# Patient Record
Sex: FEMALE | Race: BLACK OR AFRICAN AMERICAN
[De-identification: names, ages, dates, MRNs, and addresses within clinical notes are randomized per-mention and may not be internally consistent; named-entity substitution may affect disease eponyms.]

---

## 2019-03-20 ENCOUNTER — HOSPITAL ENCOUNTER (EMERGENCY)
Dept: HOSPITAL 72 - EMR | Age: 29
Discharge: HOME | End: 2019-03-20
Payer: COMMERCIAL

## 2019-03-20 VITALS — SYSTOLIC BLOOD PRESSURE: 119 MMHG | DIASTOLIC BLOOD PRESSURE: 67 MMHG

## 2019-03-20 VITALS — BODY MASS INDEX: 38.25 KG/M2 | HEIGHT: 66 IN | WEIGHT: 238 LBS

## 2019-03-20 DIAGNOSIS — Z88.0: ICD-10-CM

## 2019-03-20 DIAGNOSIS — L03.031: ICD-10-CM

## 2019-03-20 DIAGNOSIS — B35.1: Primary | ICD-10-CM

## 2019-03-20 PROCEDURE — 99282 EMERGENCY DEPT VISIT SF MDM: CPT

## 2019-03-20 NOTE — NUR
ED Nurse Note:



pt cleared to be d/c per ER provider, pt d/c and aftercare instruction provided 
w/ prescription, pt education done via discussion and handout, pt advised to 
follow up with pcp or return to ed if sx worsen or new sx develop, pt 
verbalized understanding and agrees with plan, vss, resp even and unlabored on 
RA, ambulatory w/ steady gait, left w/ all belongings. wristband removed.

## 2019-03-20 NOTE — EMERGENCY ROOM REPORT
History of Present Illness


General


Chief Complaint:  Lower Extremity Injury


Source:  Patient





Present Illness


HPI


28-year-old female presents to the emergency department complaining of 10 out 

of 10 in severity localized pain to the nail of the right great toe which has 

been progressive over the course of one week.  Patient reports initially she 

had acrylic nails on and began to have a tender/burning sensation under the 

right great toe after working a long shift when steel toe boots.  Patient was 

concerned that maybe she had bleeding under the toenail so she went to nail 

salon and had acrylic removed at that time one of the nail salon workers 

suggested that the patient may have an infection/ingrown toenail so she trimmed 

down the side of the nail.  Patient states that her symptoms progressed and the 

pain became more frequent with some swelling she returned back to the nail 

salon yesterday and the nail check trimmed the toenail down to approximately 50

% since states that her toe is now even more tender she denies ever noticing 

bruising she denies discharge she denies history of gout, fevers or chills.  

Patient denies appreciable trauma or fall.


Allergies:  


Coded Allergies:  


     PENICILLINS (Verified  Allergy, Severe, Rash, 3/20/19)





Patient History


Past Medical History:  see triage record


Past Surgical History:  none


Pertinent Family History:  none


Last Menstrual Period:  march


Pregnant Now:  No


Reviewed Nursing Documentation:  PMH: Agreed; PSxH: Agreed





Nursing Documentation-PMH


Past Medical History:  No Stated History





Review of Systems


All Other Systems:  negative except mentioned in HPI





Physical Exam





Vital Signs








  Date Time  Temp Pulse Resp B/P (MAP) Pulse Ox O2 Delivery O2 Flow Rate FiO2


 


3/20/19 19:09 98.6 80 18 120/76 98 Room Air  








Sp02 EP Interpretation:  reviewed, normal


General Appearance:  alert, GCS 15, non-toxic, mild distress


Head:  normocephalic, atraumatic


Eyes:  bilateral eye normal inspection, bilateral eye PERRL


ENT:  hearing grossly normal, normal voice


Neck:  full range of motion


Respiratory:  lungs clear, normal breath sounds, speaking full sentences


Cardiovascular #1:  regular rate, rhythm, normal capillary refill


Musculoskeletal:  back normal, gait/station normal, normal range of motion, 

tender - TTP to the nail and exposed nailbed of the right great toe. nail has 

been cut down to approximately 50%, TTP to the left lateral cuticle line, 

swelling noted, no purulent d/c noted, nail is thickened and opaque with 

notable fungal infection.  no joint involement, no tenderness other than 

directly on the toenail.


Neurologic:  alert, oriented x3, responsive, motor strength/tone normal, 

sensory intact, speech normal, grossly normal


Psychiatric:  judgement/insight normal


Skin:  normal color, no rash, warm/dry, well hydrated, other - TTP to the nail 

and exposed nailbed of the right great toe. nail has been cut down to 

approximately 50%, TTP to the left lateral cuticle line, swelling noted, no 

purulent d/c noted, nail is thickened and opaque with notable fungal infection.

  no joint involement, no tenderness other than directly on the toenail.  Nail 

is tender to percussion


Lymphatic:  no adenopathy





Medical Decision Making


PA Attestation


Dr. Shah is my supervising Physician whom patient management has been 

discussed with.


Diagnostic Impression:  


 Primary Impression:  


 Onychomycosis of right great toe


 Additional Impressions:  


 Pain due to onychomycosis of toenail of right foot


 Paronychia of great toe, right


ER Course


28-year-old female presents to the emergency department complaining of 10 out 

of 10 in severity localized pain to the nail of the right great toe which has 

been progressive over the course of one week.  Patient reports initially she 

had acrylic nails on and began to have a tender/burning sensation under the 

right great toe after working a long shift when steel toe boots.  Patient was 

concerned that maybe she had bleeding under the toenail so she went to nail 

salon and had acrylic removed at that time one of the nail salon workers 

suggested that the patient may have an infection/ingrown toenail so she trimmed 

down the side of the nail.  Patient states that her symptoms progressed and the 

pain became more frequent with some swelling she returned back to the nail 

salon yesterday and the nail check trimmed the toenail down to approximately 50

% since states that her toe is now even more tender she denies ever noticing 

bruising she denies discharge she denies history of gout, fevers or chills.  

Patient denies appreciable trauma or fall. 





Ddx considered but are not limited to cellulitis, paronychia, eponychia, 

ingrown toe nail, fracture, d/L, gout








Vital signs: are WNL, pt. is afebrile


H&PE are most consistent with right great toe paronychia and chronic fungal 

infection of the nail--Nail is tender to percussion





ORDERS: none required at this time, the diagnosis is clinical





ED INTERVENTIONS: 


- no palpable fluctuance or visible purulent d/c to warrant I & D.  Nail is 

tender to percussion


- will d/c pt. with PO abx.





-I do not identify an emergent condition at this time. With current presentation

,  pt. is stable for close outpatient follow up and conservative treatment.  D/

w pt. to return promptly to ED with worsening or new symptoms.- Pt. verbalizes' 

understanding and agreement with proposed treatment plan.proposed treatment 

plan.





DISCHARGE: At this time pt. is stable for d/c to home. Will provide printed 

patient care instructions, and any necessary prescriptions. Care plan and 

follow up instructions have been discussed with the patient prior to discharge.





Last Vital Signs








  Date Time  Temp Pulse Resp B/P (MAP) Pulse Ox O2 Delivery O2 Flow Rate FiO2


 


3/20/19 19:09 98.6 80 18 120/76 98 Room Air  








Disposition:  HOME, SELF-CARE


Condition:  Stable


Scripts


Ibuprofen* (MOTRIN*) 600 Mg Tablet


600 MG ORAL THREE TIMES A DAY, #30 TAB 0 Refills


   Prov: Naima Sanz         3/20/19 


Acetaminophen With Codeine (T#3) (TYLENOL #3 TAB*) Y Tab


1 TAB ORAL Q6H PRN for For Pain, #9 TAB


   Prov: Naima Sanz         3/20/19 


Clindamycin Hcl (CLINDAMYCIN HCL) 300 Mg Capsule


300 MG ORAL FOUR TIMES A DAY for 5 Days, #20 CAP


   Prov: Naima Sanz         3/20/19 


Ciclopirox (PENLAC) 6.6 Ml Solution


1 APPLIC TP DAILY, #6.6 ML


   Prov: Naima Sanz         3/20/19


Departure Forms:  Return to Work      Return to Work Date:  Mar 24, 2019


   Work Restrictions:  None


   Other Restrictions:  May return Sooner if Symptoms have resolved. 


   Return to Full Activity:  Mar 24, 2019


Patient Instructions:  Paronychia, Easy-to-Read





Additional Instructions:  


Take medications as directed. 





 ** Follow up with a Primary Care Provider in 3-5 days, even if your symptoms 

have resolved. **  PODIATRIST REFERRAL **


--Please review list of primary care clinics, if you do not already have a 

primary care provider





Return sooner to ED if new symptoms occur, or current symptoms become worse. 


Do not drink alcohol, drive, or operate heavy machinery while taking Tylenol # 

3  as this may cause drowsiness. 











- Please note that this Emergency Department Report was dictated using [employer] technology software, occasionally this can lead to 

erroneous entry secondary to interpretation by the dictation equipment.











Naima Sanz Mar 20, 2019 20:13

## 2019-03-20 NOTE — NUR
ED Nurse Note:



PT walked in c/o right big toe pain, pt states when she was walking with boots 
it started hurting and went to nail salon to see if it was ingroin nail but 
still had pain after nail removal. Noted tenderness on right big toe with 
redness, no drainage nor pus noted, will cont monitor.